# Patient Record
Sex: MALE | Race: WHITE | NOT HISPANIC OR LATINO | Employment: FULL TIME | URBAN - METROPOLITAN AREA
[De-identification: names, ages, dates, MRNs, and addresses within clinical notes are randomized per-mention and may not be internally consistent; named-entity substitution may affect disease eponyms.]

---

## 2023-09-29 PROBLEM — D22.70 MELANOCYTIC NEVI OF LOWER EXTREMITY OR HIP: Status: ACTIVE | Noted: 2023-05-17

## 2023-09-29 PROBLEM — D22.39 MELANOCYTIC NEVI OF OTHER PARTS OF FACE: Status: ACTIVE | Noted: 2023-05-17

## 2023-09-29 PROBLEM — D22.5 MELANOCYTIC NEVI OF TRUNK: Status: ACTIVE | Noted: 2023-05-17

## 2023-09-29 PROBLEM — D22.60 MELANOCYTIC NEVI OF UNSPECIFIED UPPER LIMB, INCLUDING SHOULDER: Status: ACTIVE | Noted: 2023-05-17

## 2023-09-29 PROBLEM — L81.4 OTHER MELANIN HYPERPIGMENTATION: Status: ACTIVE | Noted: 2023-05-17

## 2023-09-29 PROBLEM — L71.8 OTHER ROSACEA: Status: ACTIVE | Noted: 2023-05-17

## 2023-09-29 PROBLEM — K57.30 DIVERTICULOSIS OF COLON: Status: ACTIVE | Noted: 2023-09-29

## 2023-09-29 PROBLEM — L57.0 ACTINIC KERATOSIS: Status: ACTIVE | Noted: 2023-05-17

## 2023-09-29 PROBLEM — D18.01 HEMANGIOMA OF SKIN AND SUBCUTANEOUS TISSUE: Status: ACTIVE | Noted: 2023-05-17

## 2023-09-29 PROBLEM — L71.1 RHINOPHYMA: Status: ACTIVE | Noted: 2023-05-17

## 2023-09-29 PROBLEM — K63.5 POLYP OF COLON: Status: ACTIVE | Noted: 2023-09-29

## 2023-09-29 PROBLEM — L82.1 OTHER SEBORRHEIC KERATOSIS: Status: ACTIVE | Noted: 2023-05-17

## 2023-09-29 RX ORDER — DOXYCYCLINE 100 MG/1
100 TABLET ORAL
COMMUNITY
Start: 2023-05-17

## 2023-09-29 RX ORDER — METRONIDAZOLE 250 MG/1
250 TABLET ORAL
COMMUNITY
Start: 2022-07-13

## 2023-09-29 RX ORDER — PIMECROLIMUS 10 MG/G
1 CREAM TOPICAL
COMMUNITY
Start: 2023-04-03

## 2023-10-04 ENCOUNTER — OFFICE VISIT (OUTPATIENT)
Dept: DERMATOLOGY | Facility: CLINIC | Age: 53
End: 2023-10-04
Payer: COMMERCIAL

## 2023-10-04 DIAGNOSIS — L28.1 PRURIGO NODULARIS: ICD-10-CM

## 2023-10-04 DIAGNOSIS — L57.0 ACTINIC KERATOSIS: Primary | ICD-10-CM

## 2023-10-04 DIAGNOSIS — L71.9 ROSACEA: ICD-10-CM

## 2023-10-04 PROCEDURE — 99213 OFFICE O/P EST LOW 20 MIN: CPT | Performed by: DERMATOLOGY

## 2023-10-04 RX ORDER — ATORVASTATIN CALCIUM 20 MG/1
1 TABLET, FILM COATED ORAL NIGHTLY
COMMUNITY
Start: 2022-12-05

## 2023-10-04 RX ORDER — CETIRIZINE HYDROCHLORIDE, PSEUDOEPHEDRINE HYDROCHLORIDE 5; 120 MG/1; MG/1
TABLET, FILM COATED, EXTENDED RELEASE ORAL
COMMUNITY
Start: 2019-07-15

## 2023-10-04 RX ORDER — NAPROXEN SODIUM 220 MG
TABLET ORAL EVERY 8 HOURS
COMMUNITY

## 2023-10-04 RX ORDER — METFORMIN HYDROCHLORIDE 1000 MG/1
1000 TABLET ORAL
COMMUNITY
Start: 2022-12-05

## 2023-10-04 RX ORDER — GLUCOSAM/CHONDRO/HERB 149/HYAL 750-100 MG
2 TABLET ORAL DAILY
COMMUNITY
Start: 2022-05-09

## 2023-10-04 NOTE — PROGRESS NOTES
Subjective     Nabil Monroe is a 53 y.o. male who presents for the following: Rosacea (Face- pt is taking triple cream, using doxy prn- partial response.) and Suspicious Skin Lesion (Left cheek lesion, pt requesting LN2/Left neck hairline lesion).     Review of Systems:  No other skin or systemic complaints other than what is documented elsewhere in the note.    The following portions of the chart were reviewed this encounter and updated as appropriate:   Allergies  Meds  Problems  Med Hx  Surg Hx  Fam Hx         Skin Cancer History  No skin cancer on file.      Specialty Problems          Dermatology Problems    Actinic keratosis    Hemangioma of skin and subcutaneous tissue    Melanocytic nevi of lower extremity or hip    Melanocytic nevi of other parts of face    Melanocytic nevi of trunk    Melanocytic nevi of unspecified upper limb, including shoulder    Other melanin hyperpigmentation    Other rosacea    Other seborrheic keratosis    Rhinophyma        Objective   Well appearing patient in no apparent distress; mood and affect are within normal limits.    A focused skin examination was performed. All findings within normal limits unless otherwise noted below.    Assessment/Plan   1. Actinic keratosis (2)  Left Buccal Cheek, Left Zygomatic Area  Erythematous scaly macule(s)    Patient returned for LN2 treatment of AK which patient declined at last visit.  However, patient has a wedding to attend in 1.5 weeks and declines LN2 again today  Return in 1 month for LN2.    2. Prurigo nodularis  Left Occipital Scalp  Erythematous/hyperpigmented papule    -Discussed nature of condition  -Stop manipulating (pt agrees to commonly picking at the area)    3. Rosacea  Mid Tip of Nose  Erythema and telangiectasia with papules and pustules    Stable  -Discussed nature of this chronic condition  -Recommend gentle skin care habits including gentle cleansers, non-comedogenic physical/mineral based sunscreen daily. Avoid  exfoliation, wind and extreme temperatures when possible.   -Continue Skin Medicinals Triple Cream daily  -Continue doxycycline by mouth; patient is taking as needed. Discussed when starting, must complete at least 10 days of therapy to reduce risk of antimicrobial resistance.    Patient has tried previously and reports failing: Amoxicillin, soolantra, accutane; reports that oral metronidazole was too harsh but experienced no side effects; patient declines accutane course, rhofade, mirvaso, elidel and resurfacing of rhinophyma at this time    Patient has previously had 6 PDL treatments without improvement.        Return in 4-6 weeks for LN2 to [facility]; April for FSE if patient is willing/desires  Discussed if there are any changes or development of concerning symptoms (lesion/skin condition is changing, bleeding, enlarging, or worsening) the patient is to contact my office. The patient verbalizes understanding.    Cheryl Hardy MD  10/4/2023

## 2023-10-25 ENCOUNTER — OFFICE VISIT (OUTPATIENT)
Dept: DERMATOLOGY | Facility: CLINIC | Age: 53
End: 2023-10-25
Payer: COMMERCIAL

## 2023-10-25 DIAGNOSIS — L57.0 ACTINIC KERATOSIS: Primary | ICD-10-CM

## 2023-10-25 NOTE — PROGRESS NOTES
Subjective     Nabil Monroe is a 53 y.o. male who presents for the following: Suspicious Skin Lesion (LN2 to AK's left buccal cheek, left zygomatic area).     Review of Systems:  No other skin or systemic complaints other than what is documented elsewhere in the note.    The following portions of the chart were reviewed this encounter and updated as appropriate:   Allergies  Meds  Problems  Med Hx  Surg Hx  Fam Hx         Skin Cancer History  No skin cancer on file.      Specialty Problems          Dermatology Problems    Actinic keratosis    Hemangioma of skin and subcutaneous tissue    Melanocytic nevi of lower extremity or hip    Melanocytic nevi of other parts of face    Melanocytic nevi of trunk    Melanocytic nevi of unspecified upper limb, including shoulder    Other melanin hyperpigmentation    Other rosacea    Other seborrheic keratosis    Rhinophyma        Objective   Well appearing patient in no apparent distress; mood and affect are within normal limits.    A focused skin examination was performed. All findings within normal limits unless otherwise noted below.    Assessment/Plan   1. Actinic keratosis (3)  Left Buccal Cheek (2), Left Zygomatic Area  Erythematous scaly macule(s)    -Discussed nature of diagnosis and treatment options.   -Patient wishes to proceed with Cryotherapy today  -Possible side effects of liquid nitrogen treatment reviewed including formation of blisters, crusting, tenderness, scar, and discoloration which may be permanent.  -Patient advised to return the office for re-evaluation if the treated lesion(s) do not resolve within 4-6 weeks. Patient verbalizes understanding.    Destr of lesion - Left Buccal Cheek (2), Left Zygomatic Area  Complexity: simple    Destruction method: cryotherapy    Informed consent: discussed and consent obtained    Lesion destroyed using liquid nitrogen: Yes    Outcome: patient tolerated procedure well with no complications    Post-procedure  details: wound care instructions given          Follow up in April for FSE  Discussed if there are any changes or development of concerning symptoms (lesion/skin condition is changing, bleeding, enlarging, or worsening) the patient is to contact my office. The patient verbalizes understanding.    Cheryl Hardy MD  10/25/2023

## 2024-02-20 ENCOUNTER — TELEPHONE (OUTPATIENT)
Dept: DERMATOLOGY | Facility: CLINIC | Age: 54
End: 2024-02-20
Payer: COMMERCIAL

## 2024-02-20 RX ORDER — DOXYCYCLINE 100 MG/1
CAPSULE ORAL
Qty: 60 CAPSULE | Refills: 2 | Status: SHIPPED | OUTPATIENT
Start: 2024-02-20

## 2024-02-20 NOTE — TELEPHONE ENCOUNTER
Pt called office and is requesting refill of Doxycyline for his rosacea.  Please send prescription if appropriate.    Thank you!    Gatito Regalado LPN    Medication was sent to pharmacy per MD and pt was notified.    Gatito Regalado LPN

## 2024-04-29 ENCOUNTER — OFFICE VISIT (OUTPATIENT)
Dept: DERMATOLOGY | Facility: CLINIC | Age: 54
End: 2024-04-29
Payer: COMMERCIAL

## 2024-04-29 DIAGNOSIS — L81.4 LENTIGO: ICD-10-CM

## 2024-04-29 DIAGNOSIS — L82.1 SEBORRHEIC KERATOSIS: ICD-10-CM

## 2024-04-29 DIAGNOSIS — D22.9 MULTIPLE BENIGN NEVI: Primary | ICD-10-CM

## 2024-04-29 DIAGNOSIS — D18.01 HEMANGIOMA OF SKIN: ICD-10-CM

## 2024-04-29 DIAGNOSIS — L57.0 ACTINIC KERATOSIS: ICD-10-CM

## 2024-04-29 PROCEDURE — 99213 OFFICE O/P EST LOW 20 MIN: CPT | Performed by: DERMATOLOGY

## 2024-04-29 PROCEDURE — 17000 DESTRUCT PREMALG LESION: CPT | Performed by: DERMATOLOGY

## 2024-04-29 NOTE — PROGRESS NOTES
Subjective     Nabil Monroe is a 53 y.o. male who presents for the following: Skin Check (Pt requests waist up skin check, declines full skin, Personal history of Actinic Keratosis. Pt reports family history non melanoma skin cancer. ).     Review of Systems:  No other skin or systemic complaints other than what is documented elsewhere in the note.    The following portions of the chart were reviewed this encounter and updated as appropriate:  Allergies  Meds  Problems  Med Hx  Surg Hx  Fam Hx         Skin Cancer History  No skin cancer on file.      Specialty Problems          Dermatology Problems    Actinic keratosis    Hemangioma of skin and subcutaneous tissue    Melanocytic nevi of lower extremity or hip    Melanocytic nevi of other parts of face    Melanocytic nevi of trunk    Melanocytic nevi of unspecified upper limb, including shoulder    Other melanin hyperpigmentation    Other rosacea    Other seborrheic keratosis    Rhinophyma        Objective     Well appearing patient in no apparent distress; mood and affect are within normal limits.    A full examination above the waist was performed including scalp, face, neck, chest, abdomen, back, and bilateral upper extremities.   The patient declines examination below the waist.    All findings within normal limits unless otherwise noted below.      Assessment/Plan   1. Multiple benign nevi  Brown and tan macules and papules with reassuring findings on dermoscopy    -These lesions have benign, reassuring patterns on dermoscopy  -Recommend continued self observation, and to contact the office if any changes in nevi are noticed    2. Actinic keratosis  Right Parotid Area  Erythematous scaly macule(s)    -Discussed nature of diagnosis and treatment options.   -Patient wishes to proceed with Cryotherapy today  -Possible side effects of liquid nitrogen treatment reviewed including formation of blisters, crusting, tenderness, scar, and discoloration which may  be permanent.  -Patient advised to return the office for re-evaluation if the treated lesion(s) do not resolve within 4-6 weeks. Patient verbalizes understanding.    Destr of lesion - Right Parotid Area  Complexity: simple    Destruction method: cryotherapy    Informed consent: discussed and consent obtained    Lesion destroyed using liquid nitrogen: Yes    Outcome: patient tolerated procedure well with no complications    Post-procedure details: wound care instructions given      Related Procedures  Follow Up In Dermatology - Established Patient    3. Lentigo  Tan macules    -Benign appearing on exam  -Reassurance, recommend observation    4. Seborrheic keratosis  Stuck on, waxy macule(s)/papule(s)/plaque(s) with comedo-like openings and milia like cysts    -Discussed the nature of the diagnosis  -Reassurance, recommend continued observation    5. Hemangioma of skin  Cherry red papules    -Discussed the nature of the diagnosis  -Reassurance, recommend continued observation          Discussed/information given on safe sun practices and use of sunscreen, sun protective clothing or sun avoidance. Recommend to use over the counter medication of sunscreen with a SPF 30 or higher on a daily basis prior to sun exposure to reduce the risk of skin cancer.    Follow up in 1 year for FSE  Discussed if there are any changes or development of concerning symptoms (lesion/skin condition is changing, bleeding, enlarging, or worsening) the patient is to contact my office. The patient verbalizes understanding.    Cheryl Hardy MD    4/29/2024

## 2024-07-09 DIAGNOSIS — L71.9 ROSACEA: Primary | ICD-10-CM

## 2024-07-09 RX ORDER — DOXYCYCLINE 100 MG/1
TABLET ORAL
Qty: 60 TABLET | Refills: 2 | Status: SHIPPED | OUTPATIENT
Start: 2024-07-09

## 2025-01-13 ENCOUNTER — TELEPHONE (OUTPATIENT)
Dept: DERMATOLOGY | Facility: CLINIC | Age: 55
End: 2025-01-13
Payer: COMMERCIAL

## 2025-01-13 NOTE — TELEPHONE ENCOUNTER
Pt called stating he has some questions about red bumps on his face- pt states he is taking doxycycline prn with no change that his chin area. RN called pt back and notified him that Dr. Domínguez would need to see him in person to go over treatment options and diagnosis. Pt is taking the doxycycline for his rosacea. Pt verbalized understanding. RN notified pt he can come in for appt Wednesday 1.15.25, pt accepted appt, RN notified PAR to schedule pt. Pt has no further questions or concerns at this time.     Concepcion Mcmullen RN

## 2025-01-15 ENCOUNTER — OFFICE VISIT (OUTPATIENT)
Dept: DERMATOLOGY | Facility: CLINIC | Age: 55
End: 2025-01-15
Payer: COMMERCIAL

## 2025-01-15 DIAGNOSIS — L73.9 FOLLICULITIS: Primary | ICD-10-CM

## 2025-01-15 DIAGNOSIS — L71.9 ROSACEA: ICD-10-CM

## 2025-01-15 PROCEDURE — 87070 CULTURE OTHR SPECIMN AEROBIC: CPT | Performed by: DERMATOLOGY

## 2025-01-15 PROCEDURE — 99214 OFFICE O/P EST MOD 30 MIN: CPT | Performed by: DERMATOLOGY

## 2025-01-15 RX ORDER — CLINDAMYCIN PHOSPHATE AND BENZOYL PEROXIDE 10; 50 MG/G; MG/G
GEL TOPICAL
Qty: 45 G | Refills: 1 | Status: SHIPPED | OUTPATIENT
Start: 2025-01-15

## 2025-01-15 RX ORDER — SULFAMETHOXAZOLE AND TRIMETHOPRIM 800; 160 MG/1; MG/1
TABLET ORAL
Qty: 20 TABLET | Refills: 0 | Status: SHIPPED | OUTPATIENT
Start: 2025-01-15

## 2025-01-15 NOTE — PROGRESS NOTES
Subjective     Nabil Monroe is a 54 y.o. male who presents for the following: Suspicious Skin Lesion (Red lesions- to face, mainly in beard area, posterior neck hairline and scalp- pt states has been taking doxycycline prn for his rosacea. No response. Denies topical treatments. ).     Review of Systems:  No other skin or systemic complaints other than what is documented elsewhere in the note.    The following portions of the chart were reviewed this encounter and updated as appropriate:   Allergies  Meds  Problems  Med Hx  Surg Hx  Fam Hx                Objective   Well appearing patient in no apparent distress; mood and affect are within normal limits.    A focused skin examination was performed. All findings within normal limits unless otherwise noted below.    Assessment/Plan   1. Folliculitis  Left Buccal Cheek, Right Occipital Scalp  Follicular based robust papules and pustules    -Discussed nature of condition  -Discussed treatment options  -This condition is often worsened by heat exposure, sweat exposure, friction/pressure on the skin. Off-load pressure as possible and wear loose, breathable clothing.  -Bacterial culture taken today to r/o resistant bacteria due to off and on doxycycline use (see below)   -Recommend 10 day course of bactrim  -Start clindmycin BP gel to active areas once daily    -Recommend antibiotic therapy with rx Bactrim DS/Sulfamethoxazole-Trimethoprim 800/160mg one pill by mouth twice daily   -Patient denies sulfa allergy  -Discussed/information given on the risks of rx Bactrim, including but not limited to, allergy/hypersensitivity, anemia, low platelets, sun sensitivity, liver/kidney/pancreas impairment, rash on the skin (may include Rodriguez Kev Syndrome, which is a serious, potentially life-threatening rash). If the patient develops burning skin pain, pain on urination or when eating, the medication should be discontinued immediately and the patient should report to the  emergency room immediately. More common reactions include upset stomach, diarrhea, dizziness or headache.      Related Procedures  Tissue/Wound Culture/Smear    Related Medications  sulfamethoxazole-trimethoprim (Bactrim DS) 800-160 mg tablet  Take one pill by mouth twice daily.    clindamycin-benzoyl peroxide (Duac) 1.2-5% gel  Apply to affected areas once daily. May bleach fabrics.    2. Rosacea  Mid Tip of Nose  Erythema and telangiectasia    Stable  -Continue Skin Medicinals Triple Cream daily  -Doxycycline by mouth; patient is taking as needed. Discussed again today that this medication should not be taking off and on as this increases the chance of antibiotic resistance. Patient verbalizes understanding but wishes to only take the medication when the condition flares.  -Hold doxycycline presently as doing bactrim course  -Patient will notify me in 2 weeks of status and next steps    Patient has tried previously and reports failing: Amoxicillin, soolantra, accutane; reports that oral metronidazole was too harsh but experienced no side effects; patient declines accutane course, rhofade, mirvaso, elidel and resurfacing of rhinophyma at this time    Patient has previously had 6 PDL treatments without improvement.    Related Medications  doxycycline (Vibramycin) 100 mg capsule  Take one pill by mouth twice daily with food and at least 8 ounces (large glass) of water, do not lie down for 30 minutes after taking.    doxycycline (Adoxa) 100 mg tablet  TAKE 1 TABLET BY MOUTH TWICE A DAY WITH FOOD AND WATER-DO NOT LIE DOWN FOR 1 HR AFTER TAKING        Follow up as needed.    Discussed if there are any changes or development of concerning symptoms (lesion/skin condition is changing, bleeding, enlarging, or worsening) the patient is to contact my office. The patient verbalizes understanding.    Cheryl Hardy MD  1/15/2025

## 2025-01-17 ENCOUNTER — TELEPHONE (OUTPATIENT)
Dept: DERMATOLOGY | Facility: CLINIC | Age: 55
End: 2025-01-17
Payer: COMMERCIAL

## 2025-01-17 NOTE — RESULT ENCOUNTER NOTE
Contacted patient regarding the initial stain of the culture we took in the office. Per Dr. Sang Hardy, the strain is negative for any bacteria; however, the culture will attempt to grow for another few days. Patient verbalized understanding. No further questions or concerns noted. Patient shall receive finalized results. Provided office number for patient to reach out if he does not hear back.

## 2025-01-18 LAB
B-LACTAMASE ORGANISM ISLT: NEGATIVE
BACTERIA SPEC CULT: NORMAL
GRAM STN SPEC: NORMAL
GRAM STN SPEC: NORMAL

## 2025-03-21 ENCOUNTER — TELEPHONE (OUTPATIENT)
Dept: DERMATOLOGY | Facility: CLINIC | Age: 55
End: 2025-03-21
Payer: COMMERCIAL

## 2025-03-21 NOTE — TELEPHONE ENCOUNTER
RN received voicemail from pt stating he would like his medical records released. RN called pt and sent to . RN notified pt in  to please call back when able to help guide pt to release his medical records. RN provided office number in  for pt to call back.    Stephani OHN RN

## 2025-05-05 ENCOUNTER — APPOINTMENT (OUTPATIENT)
Dept: DERMATOLOGY | Facility: CLINIC | Age: 55
End: 2025-05-05
Payer: COMMERCIAL

## 2025-07-14 ENCOUNTER — TELEPHONE (OUTPATIENT)
Dept: DERMATOLOGY | Facility: CLINIC | Age: 55
End: 2025-07-14
Payer: COMMERCIAL

## 2025-07-14 NOTE — TELEPHONE ENCOUNTER
Pt called office and stated [facility] sent over a form for medical release for his care. RN informed pt that the form was sent out, but upon discussion with PAR, the form was not scanned in by our office. RN informed pt the form may have been faxed to the wrong office and scanned into his chart from there. RN notified pt that our team will send this form to [employer] to make sure this form has reached the appropriate medical release representatives and sent [facility] to the [facility] for [facility]. Patient verbalized understanding and has no further questions or concerns.     Stephani OHN RN

## 2025-07-18 DIAGNOSIS — L73.9 FOLLICULITIS: ICD-10-CM

## 2025-07-18 RX ORDER — CLINDAMYCIN PHOSPHATE AND BENZOYL PEROXIDE 10; 50 MG/G; MG/G
GEL TOPICAL
Qty: 45 G | Refills: 3 | Status: SHIPPED | OUTPATIENT
Start: 2025-07-18

## 2025-07-18 RX ORDER — SULFAMETHOXAZOLE AND TRIMETHOPRIM 800; 160 MG/1; MG/1
TABLET ORAL
Qty: 20 TABLET | Refills: 0 | Status: SHIPPED | OUTPATIENT
Start: 2025-07-18

## 2025-07-18 NOTE — TELEPHONE ENCOUNTER
Pt called requesting a refill on clinda-BP gel and Bactrium DS tablets. RN confirmed pharmacy with pt as [facility] in [facility]. RN informed pt this refill request will be sent to Dr. Sang Hardy for review. Patient verbalized understanding and has no further questions or concerns.     Stephani OHN RN

## 2026-01-14 ENCOUNTER — APPOINTMENT (OUTPATIENT)
Dept: DERMATOLOGY | Facility: CLINIC | Age: 56
End: 2026-01-14
Payer: COMMERCIAL